# Patient Record
Sex: FEMALE | Race: BLACK OR AFRICAN AMERICAN | NOT HISPANIC OR LATINO | Employment: STUDENT | URBAN - METROPOLITAN AREA
[De-identification: names, ages, dates, MRNs, and addresses within clinical notes are randomized per-mention and may not be internally consistent; named-entity substitution may affect disease eponyms.]

---

## 2022-03-04 ENCOUNTER — HOSPITAL ENCOUNTER (EMERGENCY)
Facility: HOSPITAL | Age: 27
Discharge: HOME | End: 2022-03-04
Attending: FAMILY MEDICINE
Payer: MEDICAID

## 2022-03-04 VITALS
OXYGEN SATURATION: 98 % | BODY MASS INDEX: 28.93 KG/M2 | HEART RATE: 94 BPM | DIASTOLIC BLOOD PRESSURE: 86 MMHG | WEIGHT: 180 LBS | SYSTOLIC BLOOD PRESSURE: 132 MMHG | RESPIRATION RATE: 16 BRPM | HEIGHT: 66 IN | TEMPERATURE: 99 F

## 2022-03-04 DIAGNOSIS — M54.50 ACUTE BILATERAL LOW BACK PAIN WITHOUT SCIATICA: ICD-10-CM
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
## 2022-03-04 PROCEDURE — 99284 EMERGENCY DEPT VISIT MOD MDM: CPT | Mod: ER

## 2022-03-04 PROCEDURE — 25000003 PHARM REV CODE 250: Mod: ER | Performed by: FAMILY MEDICINE

## 2022-03-04 RX ORDER — CYCLOBENZAPRINE HCL 10 MG
10 TABLET ORAL
Status: COMPLETED | OUTPATIENT
Start: 2022-03-04 | End: 2022-03-04

## 2022-03-04 RX ORDER — IBUPROFEN 800 MG/1
800 TABLET ORAL 3 TIMES DAILY PRN
Qty: 30 TABLET | Refills: 0 | Status: SHIPPED | OUTPATIENT
Start: 2022-03-04

## 2022-03-04 RX ORDER — IBUPROFEN 400 MG/1
800 TABLET ORAL
Status: COMPLETED | OUTPATIENT
Start: 2022-03-04 | End: 2022-03-04

## 2022-03-04 RX ORDER — CYCLOBENZAPRINE HCL 10 MG
10 TABLET ORAL 3 TIMES DAILY PRN
Qty: 30 TABLET | Refills: 0 | Status: SHIPPED | OUTPATIENT
Start: 2022-03-04

## 2022-03-04 RX ADMIN — CYCLOBENZAPRINE 10 MG: 10 TABLET, FILM COATED ORAL at 01:03

## 2022-03-04 RX ADMIN — IBUPROFEN 800 MG: 400 TABLET, FILM COATED ORAL at 01:03
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
## 2022-03-04 NOTE — ED NOTES
"Physical Assessment    LOC: The patient is awake, alert and aware of environment with an appropriate affect, the patient is oriented x 3 and speaking appropriately.     Psych: Patient is calm and cooperative with good eye contact.    APPEARANCE: Patient is clean and non toxic appearing    NEUROLOGIC:  DYLAN, Follows commands without difficulty. Speech is clear. No neuro deficits observed. --- "C/O Neck and Back pain." Denies Numbness or tingling    HEENT: Denies HEENT complaint or injury, moist mucus membranes     RESPIRATORY: Airway is open and patent, respirations are spontaneous; patient has a normal effort and rate. Bilateral breath sounds are clear. Pink nailbeds.     CARDIAC: Patient has a normal rate and rhythm, no peripheral edema noted, capillary refill < 2 seconds. PULSES are symmetrical in all extremities    GI/ : Soft and non tender to palpation, no distention noted.     MUSCULOSKELETAL:  Normal range of motion noted. Moves all extremities well, no c/o pain, no deformity noted. - Sensation intact. MAEW    SKIN: The skin is warm, dry and intact. Patient has normal skin turgor. No rashes or lesions. No Breakdown noted.    "
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
